# Patient Record
Sex: FEMALE | Race: BLACK OR AFRICAN AMERICAN | NOT HISPANIC OR LATINO | Employment: STUDENT | URBAN - METROPOLITAN AREA
[De-identification: names, ages, dates, MRNs, and addresses within clinical notes are randomized per-mention and may not be internally consistent; named-entity substitution may affect disease eponyms.]

---
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
## 2022-05-23 ENCOUNTER — HOSPITAL ENCOUNTER (EMERGENCY)
Facility: HOSPITAL | Age: 22
Discharge: HOME | End: 2022-05-23
Attending: EMERGENCY MEDICINE
Payer: MEDICAID

## 2022-05-23 VITALS
HEIGHT: 61 IN | TEMPERATURE: 99 F | RESPIRATION RATE: 20 BRPM | WEIGHT: 197.63 LBS | DIASTOLIC BLOOD PRESSURE: 94 MMHG | OXYGEN SATURATION: 98 % | HEART RATE: 82 BPM | SYSTOLIC BLOOD PRESSURE: 153 MMHG | BODY MASS INDEX: 37.31 KG/M2

## 2022-05-23 DIAGNOSIS — K08.89 PAIN, DENTAL: Primary | ICD-10-CM

## 2022-05-23 PROCEDURE — 99283 EMERGENCY DEPT VISIT LOW MDM: CPT

## 2022-05-23 RX ORDER — TRAMADOL HYDROCHLORIDE 50 MG/1
50 TABLET ORAL EVERY 6 HOURS PRN
Qty: 10 TABLET | Refills: 0 | Status: SHIPPED | OUTPATIENT
Start: 2022-05-23

## 2022-05-23 NOTE — ED PROVIDER NOTES
"   History      Chief Complaint   Patient presents with    Dental Pain     Pt states, "I am having a terrible tooth ache and it's hurting my right ear."       Review of patient's allergies indicates:  No Known Allergies     HPI   HPI    5/23/2022, 5:21 PM   History obtained from the patient      History of Present Illness: Zara Pittman is a 22 y.o. female patient who presents to the Emergency Department for dental pain for 2-3 days. Taking abx.  Denies f/n/v.  Symptoms are constant and moderate in severity. No further complaints or concerns at this time.           PCP: Primary Doctor No       Past Medical History:  Past Medical History:   Diagnosis Date    Anxiety     Asthma     Depression          Past Surgical History:  Past Surgical History:   Procedure Laterality Date    MYRINGOTOMY W/ TUBES      STOMACH SURGERY             Family History:  Family History   Problem Relation Age of Onset    No Known Problems Father     Hypertension Mother     No Known Problems Brother     No Known Problems Sister     No Known Problems Brother     No Known Problems Brother     No Known Problems Sister     No Known Problems Sister     No Known Problems Sister     No Known Problems Sister     No Known Problems Sister     No Known Problems Sister            Social History:  Social History     Tobacco Use    Smoking status: Never Smoker    Smokeless tobacco: Never Used   Substance and Sexual Activity    Alcohol use: Never    Drug use: Never    Sexual activity: Yes     Partners: Male     Birth control/protection: None       ROS   Constitutional: Negative for chills and fever.   HENT: Positive for dental problem. Negative for sore throat.    Respiratory: Negative for chest tightness and shortness of breath.    Cardiovascular: Negative for chest pain.   Gastrointestinal: Negative for nausea and vomiting.   Genitourinary: Negative for dysuria.   Musculoskeletal: Negative for back pain.   Skin: Negative for " "pallor and rash.   Neurological: Negative for weakness.   Hematological: Does not bruise/bleed easily.   All other systems reviewed and are negative.  Review of Systems    Physical Exam      Initial Vitals [05/23/22 1718]   BP Pulse Resp Temp SpO2   (!) 153/94 82 20 98.6 °F (37 °C) 98 %      MAP       --         Physical Exam  Vital signs and nursing notes reviewed.  Constitutional: Patient is in NAD. Awake and alert. Well-developed and well-nourished.  Head: Atraumatic. Normocephalic.  Eyes: PERRL. EOM intact. Conjunctivae nl. No scleral icterus.  ENT: Mucous membranes are moist. Oropharynx is clear.  gingival ttp, mild erythema.  +dental caries.  No facial edema  TM's clear  Neck: Supple. No JVD. No lymphadenopathy.  No meningismus  Cardiovascular: Regular rate and rhythm. No murmurs, rubs, or gallops. Distal pulses are 2+ and symmetric.  Pulmonary/Chest: No respiratory distress. Clear to auscultation bilaterally. No wheezing, rales, or rhonchi.  Abdominal: Soft. Non-distended. No TTP. No rebound, guarding, or rigidity. Good bowel sounds.  Genitourinary: No CVA tenderness  Musculoskeletal: Moves all extremities. No edema.   Skin: Warm and dry.  Neurological: Awake and alert. No acute focal neurological deficits are appreciated.  Psychiatric: Normal affect. Good eye contact. Appropriate in content.      ED Course      Procedures  ED Vital Signs:  Vitals:    05/23/22 1718   BP: (!) 153/94   Pulse: 82   Resp: 20   Temp: 98.6 °F (37 °C)   TempSrc: Oral   SpO2: 98%   Weight: 89.7 kg (197 lb 10.3 oz)   Height: 5' 1" (1.549 m)                 Imaging Results:  Imaging Results    None            The Emergency Provider reviewed the vital signs and test results, which are outlined above.    ED Discussion         All findings were reviewed with the patient/family in detail.  Findings seem to be most consistent with a diagnosis of dental pain and dental caries.   All remaining questions and concerns were addressed at that " time.  Patient/family has been counseled regarding the need for follow-up as well as the indication to return to the emergency room should new or worrisome developments occur.        Medication(s) given in the ER:  Medications - No data to display         Follow-up Information     Your Dentist In 2 days.                              Medication List      START taking these medications    traMADoL 50 mg tablet  Commonly known as: ULTRAM  Take 1 tablet (50 mg total) by mouth every 6 (six) hours as needed for Pain.        ASK your doctor about these medications    albuterol 90 mcg/actuation inhaler  Commonly known as: PROVENTIL/VENTOLIN HFA     CITRANATAL B-CALM (FE GLUC) 20 mg iron-1 mg -25 mg/25 mg Tbsq  Generic drug: prenatal 48-iron-folic acid-B6  Take 1 tablet by mouth once daily.     doxylamine-pyridoxine (vit B6) 10-10 mg Tbec  Commonly known as: DICLEGIS  Take 1 tablet by mouth 3 (three) times daily as needed. Take 2 tablets at night, On Day #3- add one tablet in the morning & one tablet in the afternoon.     ondansetron 4 MG Tbdl  Commonly known as: ZOFRAN-ODT     promethazine 25 MG tablet  Commonly known as: PHENERGAN           Where to Get Your Medications      You can get these medications from any pharmacy    Bring a paper prescription for each of these medications  · traMADoL 50 mg tablet             Medical Decision Making              MDM               Clinical Impression:        ICD-10-CM ICD-9-CM   1. Pain, dental  K08.89 525.9              Disposition  Stable  Discharged       Jacqueline Clark PA-C  05/23/22 4216

## 2022-05-29 ENCOUNTER — HOSPITAL ENCOUNTER (EMERGENCY)
Facility: HOSPITAL | Age: 22
Discharge: HOME | End: 2022-05-29
Attending: EMERGENCY MEDICINE
Payer: MEDICAID

## 2022-05-29 VITALS
TEMPERATURE: 98 F | SYSTOLIC BLOOD PRESSURE: 120 MMHG | RESPIRATION RATE: 17 BRPM | OXYGEN SATURATION: 100 % | BODY MASS INDEX: 37.47 KG/M2 | HEART RATE: 94 BPM | WEIGHT: 198.31 LBS | DIASTOLIC BLOOD PRESSURE: 73 MMHG

## 2022-05-29 DIAGNOSIS — K08.89 PAIN, DENTAL: Primary | ICD-10-CM

## 2022-05-29 DIAGNOSIS — K08.89 TOOTHACHE: ICD-10-CM

## 2022-05-29 PROCEDURE — 99283 EMERGENCY DEPT VISIT LOW MDM: CPT

## 2022-05-29 RX ORDER — NAPROXEN SODIUM 550 MG/1
550 TABLET ORAL 2 TIMES DAILY PRN
Qty: 12 TABLET | Refills: 0 | Status: SHIPPED | OUTPATIENT
Start: 2022-05-29

## 2022-05-29 NOTE — ED PROVIDER NOTES
SCRIBE #1 NOTE: I, Dina Perkins, am scribing for, and in the presence of, Ed Sinclair Jr., MD. I have scribed the entire note.       History     Chief Complaint   Patient presents with    Dental Pain     States R sided dental pain started a week ago     Review of patient's allergies indicates:  No Known Allergies      History of Present Illness     HPI    5/29/2022, 12:26 PM  History obtained from the patient      History of Present Illness: Zara Pittman is a 22 y.o. female patient with a PMHx of anxiety, asthma, depression who presents to the Emergency Department for evaluation of right side dental pain which onset gradually 1 week ago. The pt states her dental pain radiates to her ear. Symptoms are constant and moderate in severity. No mitigating or exacerbating factors reported. Associated sxs include none. Patient denies any fever, chills, N/V/D, HA, and all other sxs at this time. Prior Tx includes antiboitics. No further complaints or concerns at this time.       Arrival mode: Personal vehicle      PCP: Primary Doctor No        Past Medical History:  Past Medical History:   Diagnosis Date    Anxiety     Asthma     Depression        Past Surgical History:  Past Surgical History:   Procedure Laterality Date    MYRINGOTOMY W/ TUBES      STOMACH SURGERY           Family History:  Family History   Problem Relation Age of Onset    No Known Problems Father     Hypertension Mother     No Known Problems Brother     No Known Problems Sister     No Known Problems Brother     No Known Problems Brother     No Known Problems Sister     No Known Problems Sister     No Known Problems Sister     No Known Problems Sister     No Known Problems Sister     No Known Problems Sister        Social History:  Social History     Tobacco Use    Smoking status: Never Smoker    Smokeless tobacco: Never Used   Substance and Sexual Activity    Alcohol use: Never    Drug use: Never    Sexual activity: Yes      Partners: Male     Birth control/protection: None        Review of Systems     Review of Systems   Constitutional: Negative for chills and fever.        (+) dental pain   HENT: Negative for sore throat.    Respiratory: Negative for shortness of breath.    Cardiovascular: Negative for chest pain.   Gastrointestinal: Negative for diarrhea, nausea and vomiting.   Genitourinary: Negative for dysuria.   Musculoskeletal: Negative for back pain.   Skin: Negative for rash.   Neurological: Negative for weakness and headaches.   Hematological: Does not bruise/bleed easily.   All other systems reviewed and are negative.     Physical Exam     Initial Vitals [05/29/22 1217]   BP Pulse Resp Temp SpO2   120/73 94 17 98 °F (36.7 °C) 100 %      MAP       --          Physical Exam  Nursing Notes and Vital Signs Reviewed.  Constitutional: Patient is in no apparent distress.   Head: Atraumatic. Normocephalic.  Eyes: PERRL. EOM intact. Conjunctivae are not pale. No scleral icterus.  ENT: Mucous membranes are moist. Oropharynx is clear and symmetric. Right upper maxilla molar has a dental matthew with no erythema, gum swelling, or facial swelling noted during exam.   Neck: Supple. Full ROM. No lymphadenopathy.  Cardiovascular: Regular rate. Regular rhythm. No murmurs, rubs, or gallops. Distal pulses are 2+ and symmetric.  Pulmonary/Chest: No respiratory distress. Clear to auscultation bilaterally. No wheezing or rales.  Abdominal: Soft and non-distended.  There is no tenderness.  No rebound, guarding, or rigidity. Good bowel sounds.  Genitourinary: No CVA tenderness  Musculoskeletal: Moves all extremities. No obvious deformities. No edema. No calf tenderness.  Skin: Warm and dry.  Neurological:  Alert, awake, and appropriate.  Normal speech.  No acute focal neurological deficits are appreciated.  Psychiatric: Normal affect. Good eye contact. Appropriate in content.     ED Course   Procedures  ED Vital Signs:  Vitals:    05/29/22 1217   BP:  120/73   Pulse: 94   Resp: 17   Temp: 98 °F (36.7 °C)   TempSrc: Oral   SpO2: 100%   Weight: 89.9 kg (198 lb 4.9 oz)            The Emergency Provider reviewed the vital signs and test results, which are outlined above.     ED Discussion     12:30 PM: Reassessed pt at this time. Discussed with pt all pertinent ED information and results. Discussed pt dx and plan of tx. Gave pt all f/u and return to the ED instructions. All questions and concerns were addressed at this time. Pt expresses understanding of information and instructions, and is comfortable with plan to discharge. Pt is stable for discharge.    I discussed with patient and/or family/caretaker that evaluation in the ED does not suggest any emergent or life threatening medical conditions requiring immediate intervention beyond what was provided in the ED, and I believe patient is safe for discharge.  Regardless, an unremarkable evaluation in the ED does not preclude the development or presence of a serious of life threatening condition. As such, patient was instructed to return immediately for any worsening or change in current symptoms.                   ED Medication(s):  Medications - No data to display    Discharge Medication List as of 5/29/2022 12:28 PM      START taking these medications    Details   naproxen sodium (ANAPROX) 550 MG tablet Take 1 tablet (550 mg total) by mouth 2 (two) times daily as needed (pain)., Starting Sun 5/29/2022, Print              Follow-up Information     PCP. Call in 2 days.           [facility] - Emergency Dept..    Specialty: Emergency Medicine  Why: If symptoms worsen  Contact information:  [address] [facility] [address] [phone]                           Scribe Attestation:   Scribe #1: I performed the above scribed service and the documentation accurately describes the services I performed. I attest to the accuracy of the note.     Attending:   Physician Attestation Statement for Ludwinibraisa  #1: I, Ed Sinclair Jr., MD, personally performed the services described in this documentation, as scribed by Dina Perkins, in my presence, and it is both accurate and complete.           Clinical Impression       ICD-10-CM ICD-9-CM   1. Pain, dental  K08.89 525.9   2. Toothache  K08.89 525.9       Disposition:   Disposition: Discharged  Condition: Stable         Ed Sinclair Jr., MD  05/29/22 4558

## 2023-08-29 ENCOUNTER — HOSPITAL ENCOUNTER (EMERGENCY)
Facility: HOSPITAL | Age: 23
Discharge: HOME | End: 2023-08-29
Attending: EMERGENCY MEDICINE

## 2023-08-29 VITALS
WEIGHT: 200.94 LBS | RESPIRATION RATE: 19 BRPM | HEART RATE: 98 BPM | DIASTOLIC BLOOD PRESSURE: 64 MMHG | OXYGEN SATURATION: 98 % | BODY MASS INDEX: 37.97 KG/M2 | SYSTOLIC BLOOD PRESSURE: 99 MMHG | TEMPERATURE: 99 F

## 2023-08-29 DIAGNOSIS — U07.1 COVID-19: Primary | ICD-10-CM

## 2023-08-29 LAB — SARS-COV-2 RDRP RESP QL NAA+PROBE: POSITIVE

## 2023-08-29 PROCEDURE — 99283 EMERGENCY DEPT VISIT LOW MDM: CPT

## 2023-08-29 PROCEDURE — U0002 COVID-19 LAB TEST NON-CDC: HCPCS | Performed by: REGISTERED NURSE

## 2023-08-29 NOTE — ED PROVIDER NOTES
Encounter Date: 8/29/2023       History     Chief Complaint   Patient presents with    COVID-19 Concerns     Cough, congestion, fever     23-year-old female presents emergency department with complaints of cough, congestion and fever.  Reports positive exposure to COVID 4 days ago.  Denies any chest pain, shortness of breath or any other symptoms.    The history is provided by the patient.     Review of patient's allergies indicates:  No Known Allergies  Past Medical History:   Diagnosis Date    Anxiety     Asthma     Depression      Past Surgical History:   Procedure Laterality Date    MYRINGOTOMY W/ TUBES      STOMACH SURGERY       Family History   Problem Relation Age of Onset    No Known Problems Father     Hypertension Mother     No Known Problems Brother     No Known Problems Sister     No Known Problems Brother     No Known Problems Brother     No Known Problems Sister     No Known Problems Sister     No Known Problems Sister     No Known Problems Sister     No Known Problems Sister     No Known Problems Sister      Social History     Tobacco Use    Smoking status: Never    Smokeless tobacco: Never   Substance Use Topics    Alcohol use: Never    Drug use: Never     Review of Systems   Constitutional:  Positive for fever.   HENT:  Positive for congestion. Negative for sore throat.    Respiratory:  Positive for cough. Negative for shortness of breath.    Cardiovascular:  Negative for chest pain.   Gastrointestinal:  Negative for nausea.   Genitourinary:  Negative for dysuria.   Musculoskeletal:  Negative for back pain.   Skin:  Negative for rash.   Neurological:  Negative for weakness.   Hematological:  Does not bruise/bleed easily.   All other systems reviewed and are negative.      Physical Exam     Initial Vitals [08/29/23 1502]   BP Pulse Resp Temp SpO2   99/64 98 19 98.6 °F (37 °C) 98 %      MAP       --         Physical Exam    Constitutional: She appears well-developed and well-nourished. She is not  diaphoretic. No distress.   HENT:   Head: Normocephalic and atraumatic.   Eyes: Conjunctivae and EOM are normal. Pupils are equal, round, and reactive to light.   Neck: Neck supple.   Normal range of motion.  Cardiovascular:  Normal rate, regular rhythm and normal heart sounds.           No murmur heard.  Pulmonary/Chest: Breath sounds normal. No respiratory distress. She has no wheezes. She has no rales.   Abdominal: Abdomen is soft. Bowel sounds are normal. There is no abdominal tenderness. There is no rebound and no guarding.   Musculoskeletal:         General: No tenderness or edema. Normal range of motion.      Cervical back: Normal range of motion and neck supple.     Neurological: She is alert and oriented to person, place, and time. No cranial nerve deficit. GCS score is 15. GCS eye subscore is 4. GCS verbal subscore is 5. GCS motor subscore is 6.   Skin: Skin is warm and dry. Capillary refill takes less than 2 seconds.   Psychiatric: She has a normal mood and affect. Thought content normal.         ED Course   Procedures  Labs Reviewed   SARS-COV-2 RNA AMPLIFICATION, QUAL - Abnormal; Notable for the following components:       Result Value    SARS-CoV-2 RNA, Amplification, Qual Positive (*)     All other components within normal limits          Imaging Results    None          Medications - No data to display  Medical Decision Making  Amount and/or Complexity of Data Reviewed  Labs: ordered.     Details: Covid +                               Clinical Impression:   Final diagnoses:  [U07.1] COVID-19 (Primary)        ED Disposition Condition    Discharge Stable          ED Prescriptions    None       Follow-up Information       Follow up With Specialties Details Why Contact Info    [facility] - Emergency Dept. Emergency Medicine  If symptoms worsen [address] [facility] [address] [phone]             Abram Almanzar Jr., [facility]  08/29/23 2411

## 2025-04-05 ENCOUNTER — HOSPITAL ENCOUNTER (EMERGENCY)
Facility: HOSPITAL | Age: 25
Discharge: HOME | End: 2025-04-05
Attending: EMERGENCY MEDICINE

## 2025-04-05 VITALS
DIASTOLIC BLOOD PRESSURE: 69 MMHG | HEART RATE: 96 BPM | SYSTOLIC BLOOD PRESSURE: 106 MMHG | TEMPERATURE: 98 F | BODY MASS INDEX: 39.56 KG/M2 | HEIGHT: 62 IN | OXYGEN SATURATION: 99 % | RESPIRATION RATE: 18 BRPM | WEIGHT: 214.94 LBS

## 2025-04-05 DIAGNOSIS — R11.2 NAUSEA VOMITING AND DIARRHEA: Primary | ICD-10-CM

## 2025-04-05 DIAGNOSIS — R19.7 NAUSEA VOMITING AND DIARRHEA: Primary | ICD-10-CM

## 2025-04-05 DIAGNOSIS — K59.00 CONSTIPATION, UNSPECIFIED CONSTIPATION TYPE: ICD-10-CM

## 2025-04-05 DIAGNOSIS — F12.90 MARIJUANA USE: ICD-10-CM

## 2025-04-05 LAB
ABSOLUTE EOSINOPHIL (OHS): 0.12 K/UL
ABSOLUTE MONOCYTE (OHS): 0.56 K/UL (ref 0.3–1)
ABSOLUTE NEUTROPHIL COUNT (OHS): 5.32 K/UL (ref 1.8–7.7)
ALBUMIN SERPL BCP-MCNC: 3.9 G/DL (ref 3.5–5.2)
ALP SERPL-CCNC: 104 UNIT/L (ref 40–150)
ALT SERPL W/O P-5'-P-CCNC: 67 UNIT/L (ref 10–44)
AMPHET UR QL SCN: NEGATIVE
ANION GAP (OHS): 7 MMOL/L (ref 8–16)
AST SERPL-CCNC: 51 UNIT/L (ref 11–45)
B-HCG UR QL: NEGATIVE
BACTERIA #/AREA URNS AUTO: ABNORMAL /HPF
BARBITURATE SCN PRESENT UR: NEGATIVE
BASOPHILS # BLD AUTO: 0.01 K/UL
BASOPHILS NFR BLD AUTO: 0.1 %
BENZODIAZ UR QL SCN: NEGATIVE
BILIRUB SERPL-MCNC: 0.3 MG/DL (ref 0.1–1)
BILIRUB UR QL STRIP.AUTO: NEGATIVE
BUN SERPL-MCNC: 13 MG/DL (ref 6–20)
CALCIUM SERPL-MCNC: 8.5 MG/DL (ref 8.7–10.5)
CANNABINOIDS UR QL SCN: ABNORMAL
CHLORIDE SERPL-SCNC: 106 MMOL/L (ref 95–110)
CLARITY UR: ABNORMAL
CO2 SERPL-SCNC: 25 MMOL/L (ref 23–29)
COCAINE UR QL SCN: NEGATIVE
COLOR UR AUTO: ABNORMAL
CREAT SERPL-MCNC: 0.8 MG/DL (ref 0.5–1.4)
CREAT UR-MCNC: 185.1 MG/DL (ref 15–325)
ERYTHROCYTE [DISTWIDTH] IN BLOOD BY AUTOMATED COUNT: 13.7 % (ref 11.5–14.5)
GFR SERPLBLD CREATININE-BSD FMLA CKD-EPI: >60 ML/MIN/1.73/M2
GLUCOSE SERPL-MCNC: 93 MG/DL (ref 70–110)
GLUCOSE UR QL STRIP: NEGATIVE
HCT VFR BLD AUTO: 39.9 % (ref 37–48.5)
HCV AB SERPL QL IA: NEGATIVE
HGB BLD-MCNC: 12.7 GM/DL (ref 12–16)
HGB UR QL STRIP: ABNORMAL
HIV 1+2 AB+HIV1 P24 AG SERPL QL IA: NEGATIVE
HYALINE CASTS UR QL AUTO: 0 /LPF (ref 0–1)
IMM GRANULOCYTES # BLD AUTO: 0.02 K/UL (ref 0–0.04)
IMM GRANULOCYTES NFR BLD AUTO: 0.3 % (ref 0–0.5)
INFLUENZA A MOLECULAR (OHS): NEGATIVE
INFLUENZA B MOLECULAR (OHS): NEGATIVE
KETONES UR QL STRIP: NEGATIVE
LEUKOCYTE ESTERASE UR QL STRIP: ABNORMAL
LIPASE SERPL-CCNC: 25 U/L (ref 4–60)
LYMPHOCYTES # BLD AUTO: 0.68 K/UL (ref 1–4.8)
MCH RBC QN AUTO: 26.8 PG (ref 27–31)
MCHC RBC AUTO-ENTMCNC: 31.8 G/DL (ref 32–36)
MCV RBC AUTO: 84 FL (ref 82–98)
METHADONE UR QL SCN: NEGATIVE
MICROSCOPIC COMMENT: ABNORMAL
NITRITE UR QL STRIP: NEGATIVE
NUCLEATED RBC (/100WBC) (OHS): 0 /100 WBC
OPIATES UR QL SCN: NEGATIVE
PCP UR QL: NEGATIVE
PH UR STRIP: 8 [PH]
PLATELET # BLD AUTO: 240 K/UL (ref 150–450)
PMV BLD AUTO: 11.8 FL (ref 9.2–12.9)
POTASSIUM SERPL-SCNC: 3.5 MMOL/L (ref 3.5–5.1)
PROT SERPL-MCNC: 7.8 GM/DL (ref 6–8.4)
PROT UR QL STRIP: ABNORMAL
RBC # BLD AUTO: 4.74 M/UL (ref 4–5.4)
RBC #/AREA URNS AUTO: >100 /HPF (ref 0–4)
RELATIVE EOSINOPHIL (OHS): 1.8 %
RELATIVE LYMPHOCYTE (OHS): 10.1 % (ref 18–48)
RELATIVE MONOCYTE (OHS): 8.3 % (ref 4–15)
RELATIVE NEUTROPHIL (OHS): 79.4 % (ref 38–73)
SARS-COV-2 RDRP RESP QL NAA+PROBE: NEGATIVE
SODIUM SERPL-SCNC: 138 MMOL/L (ref 136–145)
SP GR UR STRIP: >=1.03
SQUAMOUS #/AREA URNS AUTO: 18 /HPF
UROBILINOGEN UR STRIP-ACNC: ABNORMAL EU/DL
WBC # BLD AUTO: 6.71 K/UL (ref 3.9–12.7)
WBC #/AREA URNS AUTO: 12 /HPF (ref 0–5)

## 2025-04-05 PROCEDURE — 86803 HEPATITIS C AB TEST: CPT | Performed by: EMERGENCY MEDICINE

## 2025-04-05 PROCEDURE — 96361 HYDRATE IV INFUSION ADD-ON: CPT

## 2025-04-05 PROCEDURE — 25500020 PHARM REV CODE 255: Performed by: EMERGENCY MEDICINE

## 2025-04-05 PROCEDURE — 99285 EMERGENCY DEPT VISIT HI MDM: CPT | Mod: 25

## 2025-04-05 PROCEDURE — 87086 URINE CULTURE/COLONY COUNT: CPT | Performed by: EMERGENCY MEDICINE

## 2025-04-05 PROCEDURE — 81025 URINE PREGNANCY TEST: CPT | Performed by: EMERGENCY MEDICINE

## 2025-04-05 PROCEDURE — 96372 THER/PROPH/DIAG INJ SC/IM: CPT | Performed by: EMERGENCY MEDICINE

## 2025-04-05 PROCEDURE — 25000003 PHARM REV CODE 250: Performed by: EMERGENCY MEDICINE

## 2025-04-05 PROCEDURE — 80307 DRUG TEST PRSMV CHEM ANLYZR: CPT | Performed by: EMERGENCY MEDICINE

## 2025-04-05 PROCEDURE — 87389 HIV-1 AG W/HIV-1&-2 AB AG IA: CPT | Performed by: EMERGENCY MEDICINE

## 2025-04-05 PROCEDURE — 63600175 PHARM REV CODE 636 W HCPCS: Performed by: EMERGENCY MEDICINE

## 2025-04-05 PROCEDURE — 80053 COMPREHEN METABOLIC PANEL: CPT | Performed by: EMERGENCY MEDICINE

## 2025-04-05 PROCEDURE — 83690 ASSAY OF LIPASE: CPT | Performed by: EMERGENCY MEDICINE

## 2025-04-05 PROCEDURE — 96365 THER/PROPH/DIAG IV INF INIT: CPT

## 2025-04-05 PROCEDURE — 81003 URINALYSIS AUTO W/O SCOPE: CPT | Mod: 59 | Performed by: EMERGENCY MEDICINE

## 2025-04-05 PROCEDURE — U0002 COVID-19 LAB TEST NON-CDC: HCPCS | Performed by: EMERGENCY MEDICINE

## 2025-04-05 PROCEDURE — 85025 COMPLETE CBC W/AUTO DIFF WBC: CPT | Performed by: EMERGENCY MEDICINE

## 2025-04-05 PROCEDURE — 87502 INFLUENZA DNA AMP PROBE: CPT | Performed by: EMERGENCY MEDICINE

## 2025-04-05 PROCEDURE — 96375 TX/PRO/DX INJ NEW DRUG ADDON: CPT

## 2025-04-05 RX ORDER — SYRING-NEEDL,DISP,INSUL,0.3 ML 29 G X1/2"
296 SYRINGE, EMPTY DISPOSABLE MISCELLANEOUS ONCE
Qty: 1 EACH | Refills: 0 | Status: SHIPPED | OUTPATIENT
Start: 2025-04-05 | End: 2025-04-05

## 2025-04-05 RX ORDER — HYDROMORPHONE HYDROCHLORIDE 2 MG/ML
0.5 INJECTION, SOLUTION INTRAMUSCULAR; INTRAVENOUS; SUBCUTANEOUS
Refills: 0 | Status: COMPLETED | OUTPATIENT
Start: 2025-04-05 | End: 2025-04-05

## 2025-04-05 RX ORDER — ONDANSETRON 4 MG/1
4 TABLET, ORALLY DISINTEGRATING ORAL EVERY 6 HOURS PRN
Qty: 12 TABLET | Refills: 0 | Status: SHIPPED | OUTPATIENT
Start: 2025-04-05

## 2025-04-05 RX ORDER — DICYCLOMINE HYDROCHLORIDE 10 MG/ML
20 INJECTION INTRAMUSCULAR
Status: COMPLETED | OUTPATIENT
Start: 2025-04-05 | End: 2025-04-05

## 2025-04-05 RX ADMIN — SODIUM CHLORIDE, POTASSIUM CHLORIDE, SODIUM LACTATE AND CALCIUM CHLORIDE 1000 ML: 600; 310; 30; 20 INJECTION, SOLUTION INTRAVENOUS at 08:04

## 2025-04-05 RX ADMIN — METOCLOPRAMIDE 20 MG: 5 INJECTION, SOLUTION INTRAMUSCULAR; INTRAVENOUS at 05:04

## 2025-04-05 RX ADMIN — IOHEXOL 100 ML: 350 INJECTION, SOLUTION INTRAVENOUS at 08:04

## 2025-04-05 RX ADMIN — HYDROMORPHONE HYDROCHLORIDE 0.5 MG: 2 INJECTION, SOLUTION INTRAMUSCULAR; INTRAVENOUS; SUBCUTANEOUS at 07:04

## 2025-04-05 RX ADMIN — DICYCLOMINE HYDROCHLORIDE 20 MG: 20 INJECTION, SOLUTION INTRAMUSCULAR at 08:04

## 2025-04-05 RX ADMIN — SODIUM CHLORIDE 1000 ML: 9 INJECTION, SOLUTION INTRAVENOUS at 06:04

## 2025-04-05 NOTE — ED PROVIDER NOTES
"Emergency Medicine Provider Note - 4/5/2025       SCRIBE NOTE: IDarvin Jr., am scribing for, and in the presence of Fidelina Adamson DO, FACEP     History     Chief Complaint   Patient presents with    Abdominal Pain     Pt complaining of bilateral upper quadrant pain x2 days. +N/+V/+D    Emesis       Allergies:  Review of patient's allergies indicates:   Allergen Reactions    Coconut Hives    Peanut Hives       History of Present Illness   HPI    4/5/2025, 8:13 AM  The history is provided by the patient    Zara Pittman is a 25 y.o. female presenting to the ED for N/V/D which onset yesterday. Pt has had 2-4 episodes of diarrhea but denies any blood in stool. Pt has SOB, diaphoresis with chills, and some bilateral "stabbing" lower abd pain. Pt smokes marijuana occasionally. Denies any CP or chest pressure. Denies any urinary sxs, fever, or chills. Pt is on her menstrual cycle.  Denies vaginal discharge Additional associated sxs include sore throat and rhinorrhea. Denies all other sxs at this time. No further complaints or concerns at this time.       Arrival mode: Private Vehicle     PCP: No, Primary Doctor     Past Medical History:  Past Medical History:   Diagnosis Date    Anxiety     Asthma     Depression        Past Surgical History:  Past Surgical History:   Procedure Laterality Date    MYRINGOTOMY W/ TUBES      STOMACH SURGERY           Family History:  Family History   Problem Relation Name Age of Onset    No Known Problems Father      Hypertension Mother      No Known Problems Brother      No Known Problems Sister      No Known Problems Brother      No Known Problems Brother      No Known Problems Sister      No Known Problems Sister      No Known Problems Sister      No Known Problems Sister      No Known Problems Sister      No Known Problems Sister         Social History:  Social History     Tobacco Use    Smoking status: Never    Smokeless tobacco: Never   Substance and Sexual Activity "    Alcohol use: Never    Drug use: Yes     Types: Marijuana    Sexual activity: Yes     Partners: Male     Birth control/protection: None       I have reviewed the Past Medical History, Past Surgical History, Family History and Social History as documented above.      Review of Systems   Review of Systems   Constitutional:  Positive for chills and diaphoresis. Negative for fever.   HENT:  Positive for rhinorrhea and sore throat. Negative for congestion.    Respiratory:  Positive for shortness of breath. Negative for cough.    Cardiovascular:  Negative for chest pain.   Gastrointestinal:  Positive for abdominal pain (bilateral), diarrhea, nausea and vomiting. Negative for blood in stool.   Genitourinary: Negative.  Negative for dysuria and vaginal discharge.   Musculoskeletal:  Negative for back pain.   Skin:  Negative for rash.   Neurological:  Negative for dizziness, weakness, light-headedness and headaches.   Hematological:  Does not bruise/bleed easily.   All other systems reviewed and are negative.         Physical Exam     Initial Vitals [04/05/25 0520]   BP Pulse Resp Temp SpO2   130/84 100 18 98.3 °F (36.8 °C) 99 %      MAP       --          Physical Exam    Nursing Notes and Vital Signs Reviewed.  Constitutional: Patient is in no acute distress. Well-developed and well-nourished.  Head: Atraumatic. Normocephalic.  Eyes: PERRL. EOM intact. Conjunctivae are not pale. No scleral icterus.  ENT: Mucous membranes are moist. Oropharynx is clear and symmetric.    Neck: Supple. Full ROM. No lymphadenopathy.  Cardiovascular: Regular rate. Regular rhythm. No murmurs, rubs, or gallops. Distal pulses are 2+ and symmetric.  Pulmonary/Chest: No respiratory distress. Clear to auscultation bilaterally. No wheezing or rales.  Abdominal: Soft and non-distended.  Generalized tenderness No rebound, guarding, or rigidity. Hyperactive bowel sounds.  Genitourinary: N/A  Musculoskeletal: Moves all extremities. No obvious  "deformities. No edema. No calf tenderness.  Skin: Warm and dry.  Neurological:  Alert, awake, and appropriate.  Normal speech.  No acute focal neurological deficits are appreciated.  Psychiatric: Normal affect. Good eye contact. Appropriate in content.     ED Course   ED Procedures:  Procedures    ED Vital Signs:  Vitals:    04/05/25 0520 04/05/25 0600 04/05/25 0700 04/05/25 0706   BP: 130/84 103/68 112/75    Pulse: 100 95 104    Resp: 18  18 14   Temp: 98.3 °F (36.8 °C)      TempSrc: Oral      SpO2: 99% 98% 100%    Weight: 97.5 kg (214 lb 15.2 oz)      Height: 5' 2" (1.575 m)       04/05/25 0800   BP: 95/60   Pulse: 94   Resp: 15   Temp:    TempSrc:    SpO2: 98%   Weight:    Height:        All Lab Results:  Results for orders placed or performed during the hospital encounter of 04/05/25   Hepatitis C Antibody    Collection Time: 04/05/25  6:03 AM   Result Value Ref Range    Hep C Ab Interp Negative Negative   HIV 1/2 Ag/Ab (4th Gen)    Collection Time: 04/05/25  6:03 AM   Result Value Ref Range    HIV 1/2 Ag/Ab Negative Negative   Comp. Metabolic Panel    Collection Time: 04/05/25  6:03 AM   Result Value Ref Range    Sodium 138 136 - 145 mmol/L    Potassium 3.5 3.5 - 5.1 mmol/L    Chloride 106 95 - 110 mmol/L    CO2 25 23 - 29 mmol/L    Glucose 93 70 - 110 mg/dL    BUN 13 6 - 20 mg/dL    Creatinine 0.8 0.5 - 1.4 mg/dL    Calcium 8.5 (L) 8.7 - 10.5 mg/dL    Protein Total 7.8 6.0 - 8.4 gm/dL    Albumin 3.9 3.5 - 5.2 g/dL    Bilirubin Total 0.3 0.1 - 1.0 mg/dL     40 - 150 unit/L    AST 51 (H) 11 - 45 unit/L    ALT 67 (H) 10 - 44 unit/L    Anion Gap 7 (L) 8 - 16 mmol/L    eGFR >60 >60 mL/min/1.73/m2   Lipase    Collection Time: 04/05/25  6:03 AM   Result Value Ref Range    Lipase Level 25 4 - 60 U/L   CBC with Differential    Collection Time: 04/05/25  6:03 AM   Result Value Ref Range    WBC 6.71 3.90 - 12.70 K/uL    RBC 4.74 4.00 - 5.40 M/uL    HGB 12.7 12.0 - 16.0 gm/dL    HCT 39.9 37.0 - 48.5 %    MCV 84 82 " - 98 fL    MCH 26.8 (L) 27.0 - 31.0 pg    MCHC 31.8 (L) 32.0 - 36.0 g/dL    RDW 13.7 11.5 - 14.5 %    Platelet Count 240 150 - 450 K/uL    MPV 11.8 9.2 - 12.9 fL    Nucleated RBC 0 <=0 /100 WBC    Neut % 79.4 (H) 38 - 73 %    Lymph % 10.1 (L) 18 - 48 %    Mono % 8.3 4 - 15 %    Eos % 1.8 <=8 %    Basophil % 0.1 <=1.9 %    Imm Grans % 0.3 0.0 - 0.5 %    Neut # 5.32 1.8 - 7.7 K/uL    Lymph # 0.68 (L) 1 - 4.8 K/uL    Mono # 0.56 0.3 - 1 K/uL    Eos # 0.12 <=0.5 K/uL    Baso # 0.01 <=0.2 K/uL    Imm Grans # 0.02 0.00 - 0.04 K/uL   Urinalysis, Reflex to Urine Culture    Collection Time: 04/05/25  6:59 AM    Specimen: Urine   Result Value Ref Range    Color, UA Orange (A) Straw, Nelly, Yellow, Light-Orange    Appearance, UA Hazy (A) Clear    pH, UA 8.0 5.0 - 8.0    Spec Grav UA >=1.030 (A) 1.005 - 1.030    Protein, UA 1+ (A) Negative    Glucose, UA Negative Negative    Ketones, UA Negative Negative    Bilirubin, UA Negative Negative    Blood, UA 3+ (A) Negative    Nitrites, UA Negative Negative    Urobilinogen, UA 2.0-3.0 (A) <2.0 EU/dL    Leukocyte Esterase, UA 1+ (A) Negative   Pregnancy, urine rapid    Collection Time: 04/05/25  6:59 AM   Result Value Ref Range    hCG Qualitative, Urine Negative Negative   Drug screen panel, emergency    Collection Time: 04/05/25  6:59 AM   Result Value Ref Range    Benzodiazepine, Urine Negative Negative    Methadone, Urine Negative Negative    Cocaine, Urine Negative Negative    Opiates, Urine Negative Negative    Barbiturates, Urine Negative Negative    Amphetamines, Urine Negative Negative    THC Presumptive Positive (A) Negative    Phencyclidine, Urine Negative Negative    Urine Creatinine 185.1 15.0 - 325.0 mg/dL   Urinalysis Microscopic    Collection Time: 04/05/25  6:59 AM   Result Value Ref Range    RBC, UA >100 (H) 0 - 4 /HPF    WBC, UA 12 (H) 0 - 5 /HPF    Bacteria, UA Rare None, Rare, Occasional /HPF    Squamous Epithelial Cells, UA 18 /HPF    Hyaline Casts, UA 0 0 - 1 /LPF     Microscopic Comment     Influenza A & B by Molecular    Collection Time: 04/05/25  8:50 AM    Specimen: Nasal Swab   Result Value Ref Range    INFLUENZA A MOLECULAR Negative Negative    INFLUENZA B MOLECULAR  Negative Negative   COVID-19 Rapid Screening    Collection Time: 04/05/25  8:50 AM   Result Value Ref Range    SARS COV-2 Molecular Negative Negative               Imaging Results:  Imaging Results              CT Abdomen Pelvis With IV Contrast NO Oral Contrast (Final result)  Result time 04/05/25 08:53:35      Final result by [facility] (04/05/25 08:53:35)                   Impression:     No acute findings are identified.  Possible constipation.    All CT scans at [this location] are performed using dose modulation techniques as appropriate to a performed exam including the following:  Automated exposure control; adjustment of the mA and/or kV according to patient size (this includes techniques or standardized protocols for targeted exams where dose is matched to indication / reason for exam; i.e. extremities or head); use of iterative reconstruction technique.    Finalized on: 4/5/2025 8:53 AM By:  Tomy Carrero MD  [facility]# [id number]      2025-04-05 08:55:[id number]     [facility]               Narrative:    EXAM:  CT ABDOMEN PELVIS WITH IV CONTRAST    HISTORY: Abdominal pain.    TECHNIQUE: Axial images were acquired. The patient was given IV contrast    FINDINGS:     The lung bases are clear.    The liver is normal.  Previous cholecystectomy.  No bile duct dilatation.    The spleen is normal.    No pancreatic abnormality is identified.    The adrenal glands are normal.    The kidneys are unremarkable.    The aorta is nondilated.    No evidence of bowel obstruction.  Large amount stool throughout the colon which could reflect constipation.  No evidence of appendicitis.    There are no abnormal masses in the pelvis.  There are no abnormal fluid collections in the pelvis.    Urinary bladder is  normal.    No significant osseous abnormality is identified.                                              The Emergency Provider reviewed the vital signs and test results, which are outlined above.     ED Discussion   ED Medication(s):  Medications   sodium chloride 0.9% bolus 1,000 mL 1,000 mL (0 mLs Intravenous Stopped 4/5/25 0706)   metoclopramide (Reglan) 20 mg in 0.9% NaCl 50 mL IVPB (0 mg Intravenous Stopped 4/5/25 0621)   HYDROmorphone (PF) injection 0.5 mg (0.5 mg Intravenous Given 4/5/25 0706)   lactated ringers bolus 1,000 mL (1,000 mLs Intravenous New Bag 4/5/25 0844)   dicyclomine injection 20 mg (20 mg Intramuscular Given 4/5/25 0846)   iohexoL (OMNIPAQUE 350) injection 100 mL (100 mLs Intravenous Given 4/5/25 0839)       ED Course as of 04/05/25 1024   Sat Apr 05, 2025   0906 RBC, UA(!): >100  Pt. On menses [LB]   0907 CT Abdomen Pelvis With IV Contrast NO Oral Contrast   Large amount stool throughout the colon which could reflect constipation. [LB]   0926 Influenza A, Molecular: Negative [LB]   0926 SARS COV-2 MOLECULAR: Negative [LB]   1019 Repeat abdominal exam soft no rebound or guarding no masses.  Abdominal return precautions given.  Recommended fiber, Colace, magnesium citrate.  Patient was able to tolerate p.o. [LB]      ED Course User Index  [LB] Fidelina Adamson DO            10:23 AM: Reassessment: Dr. Adamson reassessed the pt.  The pt is resting comfortably and is NAD.  Pt states their sx have improved. Discussed test results, shared treatment plan, specific conditions for return, and the need for f/u.  Answered their questions at this time.  Pt understands and agrees to the plan.  The pt has remained hemodynamically stable through ED course and is stable for discharge.    I discussed with patient and/or family/caretaker that evaluation in the ED does not suggest any emergent or life threatening medical conditions requiring immediate intervention beyond what was provided in the ED,  and I believe patient is safe for discharge.  Regardless, an unremarkable evaluation in the ED does not preclude the development or presence of a serious of life threatening condition. As such, patient was instructed to return immediately for any worsening or change in current symptoms.     MIPS Measures     Smoker? Yes     Hypertension: Patient did not have any elevated blood pressures in the Emergency Department.         Medical Decision Making                 Medical Decision Making  Differential diagnosis: COVID, influenza, ectopic pregnancy, urinary tract infection, cannabis hyperemesis syndrome, gastroenteritis, acute kidney injury    ED course: Patient had IV established.  Patient given Reglan 20 mg IV, fluid bolus, hydromorphone, dicyclomine 20 mg IM.  Influenza and COVID negative.  Urinalysis specific gravity 1.030.  Plus one leukocyte esterase.  UPT negative.  Urine drug screen positive for marijuana.  Patient currently is on menses accounting for RBC UA greater than 100.  There was rare bacteria.  Hepatitis-C negative.  HIV negative.  Modest elevation of liver enzymes.  AST 51.  ALT 67.  Total bili 0.3.  Of note, patient has had prior cholecystectomy.  Lipase 25.  White cell count 6.71.  Hemoglobin/hematocrit 12.7/39.9.  Patient underwent CT abdomen and pelvis: No evidence of any bowel obstruction.  Large amount of stool throughout which could represent constipation.  No appendicitis.  Patient's repeat abdominal exam was benign.  It was soft no rebound or guarding no masses.  Patient was able to tolerate p.o..  Recommended magnesium citrate.  Abdominal return precautions given.    Amount and/or Complexity of Data Reviewed  Labs: ordered. Decision-making details documented in ED Course.  Radiology: ordered. Decision-making details documented in ED Course.    Risk  OTC drugs.  Prescription drug management.  Parenteral controlled substances.  Risk Details: Discharge Medication List as of 4/5/2025 10:23  "AM    START taking these medications    magnesium citrate solution  Take 296 mLs by mouth once. for 1 dose, Starting Sat 4/5/2025, Print    !! ondansetron (ZOFRAN-ODT) 4 MG TbDL  Take 1 tablet (4 mg total) by mouth every 6 (six) hours as needed., Starting Sat 4/5/2025, Print    !! - Potential duplicate medications found. Please discuss with provider.                Prescription Management: I performed a review of the patient's current Rx medication list as input by nursing staff.    Patient's Medications   New Prescriptions    MAGNESIUM CITRATE SOLUTION    Take 296 mLs by mouth once. for 1 dose    ONDANSETRON (ZOFRAN-ODT) 4 MG TBDL    Take 1 tablet (4 mg total) by mouth every 6 (six) hours as needed.   Previous Medications    ALBUTEROL (PROVENTIL/VENTOLIN HFA) 90 MCG/ACTUATION INHALER        DOXYLAMINE-PYRIDOXINE, VIT B6, (DICLEGIS) 10-10 MG TBEC    Take 1 tablet by mouth 3 (three) times daily as needed. Take 2 tablets at night, On Day #3- add one tablet in the morning & one tablet in the afternoon.    NAPROXEN SODIUM (ANAPROX) 550 MG TABLET    Take 1 tablet (550 mg total) by mouth 2 (two) times daily as needed (pain).    ONDANSETRON (ZOFRAN-ODT) 4 MG TBDL        PROMETHAZINE (PHENERGAN) 25 MG TABLET        TRAMADOL (ULTRAM) 50 MG TABLET    Take 1 tablet (50 mg total) by mouth every 6 (six) hours as needed for Pain.   Modified Medications    No medications on file   Discontinued Medications    PRENATAL 48-IRON-FOLIC ACID-B6 (CITRANATAL B-CALM, FE GLUC,) 20 MG IRON-1 MG -25 MG/25 MG TBSQ    Take 1 tablet by mouth once daily.        Discussed case verbally with:N/A    Referrals:  No orders of the defined types were placed in this encounter.        Portions of this note may have been created with voice recognition software. Occasional "wrong-word" or "sound-a-like" substitutions may have occurred due to the inherent limitations of voice recognition software. Please, read the note carefully and recognize, using context, " where substitutions have occurred.          Clinical Impression       ICD-10-CM ICD-9-CM   1. Nausea vomiting and diarrhea  R11.2 787.91    R19.7 787.01   2. Constipation, unspecified constipation type  K59.00 564.00   3. Marijuana use  F12.90 305.20         ED Disposition  Disposition:   Disposition: Discharged  Condition: Stable    Discharge to home  Patient condition: Stable    ED Follow-up   Follow-up Information       [place] In 2 days.    Why: Return to emergency department for repeated vomiting, temperature, severe pain, vaginal discharge, passing out, or worsening condition  Contact information:  [address] [facility] [address]  [phone]                               Scribe Attestation:   Scribe #1: IDarvin Jr.,  performed the above scribed service and the documentation accurately describes the services I performed. I attest to the accuracy of the note.     Attending:   Physician Attestation Statement for Scribe #1: Fidelina TERAN DO, [facility], personally performed the services described in this documentation, as scribed by Darvin Feldman Jr. , in my presence, and it is both accurate and complete.                  Fidelina Adamson DO  04/05/25 1234

## 2025-04-05 NOTE — DISCHARGE INSTRUCTIONS
Take frequent sips of Pedialyte, Powerade, Gatorade.  Popsicles.  [place] diet, bananas, breads, rice.  Avoid red sauces, fruit juices, and dairy products as can irritate your stomach.  If drinking water, be sure to have something salty such as crackers to help restore electrolytes.  Return to emergency department for: Weakness, passing out, blood in stool, blood in vomit, fever, worsening abdominal pain, or other concerns.

## 2025-04-07 LAB — BACTERIA UR CULT: NORMAL

## 2025-06-18 ENCOUNTER — PATIENT MESSAGE (OUTPATIENT)
Dept: RESEARCH | Facility: HOSPITAL | Age: 25
End: 2025-06-18
Payer: COMMERCIAL